# Patient Record
Sex: FEMALE | Race: WHITE | NOT HISPANIC OR LATINO
[De-identification: names, ages, dates, MRNs, and addresses within clinical notes are randomized per-mention and may not be internally consistent; named-entity substitution may affect disease eponyms.]

---

## 2017-03-17 ENCOUNTER — APPOINTMENT (OUTPATIENT)
Dept: OBGYN | Facility: CLINIC | Age: 62
End: 2017-03-17

## 2017-03-17 VITALS — WEIGHT: 165 LBS | HEIGHT: 61 IN | BODY MASS INDEX: 31.15 KG/M2

## 2017-03-17 VITALS — SYSTOLIC BLOOD PRESSURE: 135 MMHG | DIASTOLIC BLOOD PRESSURE: 83 MMHG

## 2017-03-19 ENCOUNTER — MESSAGE (OUTPATIENT)
Age: 62
End: 2017-03-19

## 2017-03-19 LAB — HPV HIGH+LOW RISK DNA PNL CVX: NEGATIVE

## 2017-03-21 ENCOUNTER — MESSAGE (OUTPATIENT)
Age: 62
End: 2017-03-21

## 2017-03-21 LAB — CYTOLOGY CVX/VAG DOC THIN PREP: NORMAL

## 2019-10-02 ENCOUNTER — APPOINTMENT (OUTPATIENT)
Dept: OBGYN | Facility: CLINIC | Age: 64
End: 2019-10-02
Payer: COMMERCIAL

## 2019-10-02 ENCOUNTER — TRANSCRIPTION ENCOUNTER (OUTPATIENT)
Age: 64
End: 2019-10-02

## 2019-10-02 VITALS
WEIGHT: 170 LBS | SYSTOLIC BLOOD PRESSURE: 139 MMHG | DIASTOLIC BLOOD PRESSURE: 82 MMHG | HEIGHT: 61 IN | BODY MASS INDEX: 32.1 KG/M2

## 2019-10-02 PROCEDURE — 99396 PREV VISIT EST AGE 40-64: CPT

## 2019-10-02 PROCEDURE — 82270 OCCULT BLOOD FECES: CPT

## 2019-10-02 NOTE — PHYSICAL EXAM
[Awake] : awake [Alert] : alert [Acute Distress] : no acute distress [Mass] : no breast mass [Nipple Discharge] : no nipple discharge [Axillary LAD] : no axillary lymphadenopathy [Soft] : soft [Tender] : non tender [Oriented x3] : oriented to person, place, and time [Normal] : uterus [No Bleeding] : there was no active vaginal bleeding [Uterine Adnexae] : were not tender and not enlarged [No Tenderness] : no rectal tenderness [Occult Blood] : occult blood test from digital rectal exam was negative [Nl Sphincter Tone] : normal sphincter tone [External Hemorrhoid] : an external hemorrhoid

## 2019-10-04 ENCOUNTER — MESSAGE (OUTPATIENT)
Age: 64
End: 2019-10-04

## 2019-10-04 LAB — HPV HIGH+LOW RISK DNA PNL CVX: NOT DETECTED

## 2019-10-06 ENCOUNTER — MESSAGE (OUTPATIENT)
Age: 64
End: 2019-10-06

## 2019-10-06 LAB — CYTOLOGY CVX/VAG DOC THIN PREP: NORMAL

## 2019-11-05 ENCOUNTER — CLINICAL ADVICE (OUTPATIENT)
Age: 64
End: 2019-11-05

## 2020-11-25 ENCOUNTER — APPOINTMENT (OUTPATIENT)
Dept: OBGYN | Facility: CLINIC | Age: 65
End: 2020-11-25
Payer: COMMERCIAL

## 2020-11-25 VITALS
WEIGHT: 170 LBS | HEIGHT: 61 IN | DIASTOLIC BLOOD PRESSURE: 89 MMHG | SYSTOLIC BLOOD PRESSURE: 152 MMHG | BODY MASS INDEX: 32.1 KG/M2

## 2020-11-25 DIAGNOSIS — Z12.11 ENCOUNTER FOR SCREENING FOR MALIGNANT NEOPLASM OF COLON: ICD-10-CM

## 2020-11-25 DIAGNOSIS — Z12.12 ENCOUNTER FOR SCREENING FOR MALIGNANT NEOPLASM OF COLON: ICD-10-CM

## 2020-11-25 PROCEDURE — 99396 PREV VISIT EST AGE 40-64: CPT

## 2020-11-25 PROCEDURE — 82270 OCCULT BLOOD FECES: CPT

## 2020-11-25 NOTE — DISCUSSION/SUMMARY
[FreeTextEntry1] : 64yo without gyn complaint.\par Repeat /90, which she will follow up with IM\par Plan:\par 1.Mammo\par 2.PAP\par 3.Colonosocpy\par 4.BMD 2019

## 2020-11-25 NOTE — PHYSICAL EXAM
[Examination Of The Breasts] : a normal appearance [No Masses] : no breast masses were palpable [Labia Majora] : normal [Labia Minora] : normal [Normal] : normal [Uterine Adnexae] : normal [No Tenderness] : no tenderness [Nl Sphincter Tone] : normal sphincter tone [External Hemorrhoid] : external hemorrhoid

## 2020-11-25 NOTE — HISTORY OF PRESENT ILLNESS
[FreeTextEntry1] : 64yo,  with gyn history significant for:\par 1.Endometrial polyps 2015\par LMP 2014

## 2020-11-28 ENCOUNTER — MESSAGE (OUTPATIENT)
Age: 65
End: 2020-11-28

## 2020-11-28 LAB — HPV HIGH+LOW RISK DNA PNL CVX: NOT DETECTED

## 2020-11-30 ENCOUNTER — MESSAGE (OUTPATIENT)
Age: 65
End: 2020-11-30

## 2020-11-30 LAB — CYTOLOGY CVX/VAG DOC THIN PREP: NORMAL

## 2022-01-23 ENCOUNTER — NON-APPOINTMENT (OUTPATIENT)
Age: 67
End: 2022-01-23

## 2022-01-24 ENCOUNTER — APPOINTMENT (OUTPATIENT)
Dept: ORTHOPEDIC SURGERY | Facility: CLINIC | Age: 67
End: 2022-01-24
Payer: MEDICARE

## 2022-01-24 DIAGNOSIS — M47.26 OTHER SPONDYLOSIS WITH RADICULOPATHY, LUMBAR REGION: ICD-10-CM

## 2022-01-24 PROCEDURE — 99204 OFFICE O/P NEW MOD 45 MIN: CPT

## 2022-01-24 PROCEDURE — 72100 X-RAY EXAM L-S SPINE 2/3 VWS: CPT

## 2022-01-24 RX ORDER — FAMOTIDINE 40 MG/1
40 TABLET, FILM COATED ORAL
Qty: 90 | Refills: 0 | Status: ACTIVE | COMMUNITY
Start: 2021-05-11

## 2022-01-24 RX ORDER — CHLORHEXIDINE GLUCONATE, 0.12% ORAL RINSE 1.2 MG/ML
0.12 SOLUTION DENTAL
Qty: 473 | Refills: 0 | Status: ACTIVE | COMMUNITY
Start: 2021-11-04

## 2022-01-24 RX ORDER — LISINOPRIL AND HYDROCHLOROTHIAZIDE TABLETS 20; 12.5 MG/1; MG/1
20-12.5 TABLET ORAL
Qty: 90 | Refills: 0 | Status: ACTIVE | COMMUNITY
Start: 2021-12-08

## 2022-01-24 RX ORDER — FAMOTIDINE 20 MG/1
20 TABLET, FILM COATED ORAL
Qty: 180 | Refills: 0 | Status: ACTIVE | COMMUNITY
Start: 2021-09-08

## 2022-01-24 NOTE — CONSULT LETTER
[Dear  ___] : Dear  [unfilled], [Consult Letter:] : I had the pleasure of evaluating your patient, [unfilled]. [Please see my note below.] : Please see my note below. [Consult Closing:] : Thank you very much for allowing me to participate in the care of this patient.  If you have any questions, please do not hesitate to contact me. [Sincerely,] : Sincerely, [FreeTextEntry2] : [address], [place] [address] [FreeTextEntry3] : Aston Del Angel DO, ATC\par Primary Care Sports Medicine\par [facility]

## 2022-01-24 NOTE — PHYSICAL EXAM
[de-identified] : Constitutional: Well-nourished, well-developed, No acute distress, obese\par Respiratory:  Good respiratory effort, no SOB\par Lymphatic: No regional lymphadenopathy, no lymphedema\par Psychiatric: Pleasant and normal affect, alert and oriented x3\par Musculoskeletal: normal except where as noted in regional exam\par \par Lumbar Spine Exam\par \par APPEARANCE: no marked deformities or malalignment, normal curvature of the lumbosacral spine. good posture\par POSITIVE TENDERNESS: + Left SI joint, left lumbar tenderness and spasm noted in erector spinae and quadratus lumborum\par NONTENDER: no bony midline tenderness, no marked tenderness in right lumbar musculature.\par ROM: full, although painful at end range of flexion and extension\par RESISTIVE TESTING: painless 5/5 resisted flex/ext, sidebending b/l, and rotation\par SPECIAL TESTS: neg SLR b/l, neg NEFTALY b/l, neg Trendelenburg b/l \par PULSES: 2+ DP/PT pulses\par NEURO:  L1 - S2 intact to sensation and motor, DTRs 2+/4 patella and achilles\par  [de-identified] : The following radiographs were ordered and read by me during this patient's visit. I reviewed each radiograph in detail with the patient and discussed the findings as highlighted below. \par \par 2 views of the lumbar spine were obtained today that show degenerative changes and evidence of severe osteoarthritis in the L4-L5 level, otherwise only mild arthritic changes at the other levels.  No fracture, or dislocation seen at this time. There is grade 1/grade 2 anterolisthesis of L4 on L5 malalignment. No other obvious osseous abnormality. Otherwise unremarkable.\par \par _______________\par I reviewed, interpreted and clinically correlated the following outside imaging studies,\par PATIENT NAME: Kimberly Ugalde\par PATIENT ID: [id number]\par : 1955\par DATE OF EXAM: 12/10/2019\par CT-LUMBAR SPINE NON CONTRAST\par HISTORY: M54.42 Lower back pain with left sciatica\par Technique: Helical CT scanning of the lumbar spine was performed without administration of\par intravenous contrast. Coronal and sagittal reformatted images were done. This study was\par performed using automatic exposure control and an iterative reconstruction technique\par (radiation dose reduction software) to obtain a diagnostic image quality scan with patient\par dose as low as reasonably achievable. The mA and kV were adjusted according to patient\par size. The administered radiation dose was 12.54 mSv.\par Findings:\par Comparison: None\par Alignment: There is grade 1 anterolisthesis of L4-L5 measuring 6 mm.\par Fractures: No acute fractures are identified. Vertebral body height is preserved.\par Paraspinal soft tissues: The patient is status post cholecystectomy.\par Discs: There is disc desiccation, severe loss of disc space height and vacuum disc\par phenomenon at L4-L5.\par At L1-2: No significant spinal canal or neural foraminal stenosis.\par At L2-3: There is mild disc bulge without significant canal stenosis or neural foraminal\par narrowing.\par At L3-4: There is disc bulge and right facet hypertrophy resulting in mild to moderate\par right and mild left neural foraminal narrowing with contact of the exiting right L3 nerve\par root.\par At L4-5: There is grade 1 anterolisthesis with uncovering of the disc and severe bilateral\par facet hypertrophy resulting in moderate to severe canal stenosis and severe bilateral\par neural foraminal narrowing with compression of the exiting L4 nerve roots.\par At L5-S1: No significant spinal canal or neural foraminal stenosis.\par IMPRESSION:\par Degenerative changes of the lumbar spine as described above most severe at L4-L5 where \par there is grade 1 anterolisthesis with uncovering of the disc and severe bilateral facet\par hypertrophy resulting in moderate to severe canal stenosis and severe bilateral neural\par foraminal narrowing with compression of the exiting L4 nerve roots.

## 2022-01-24 NOTE — HISTORY OF PRESENT ILLNESS
[de-identified] : Patient is here for LBP that began on 12/24/21 when she fell while in the [employer] on a broken tile. She developed LBP. She has a history of spinal stenosis which was diagnosed in 2019. She states that it was treated with PT and she has not had an issue since. She takes OTC pain medication. She has pain that radiates down her left leg with numbness and tingling. She states that she also has pain on her right shoulder and right lateral hip from the fall.  She saw her PCP for this issue who recommended PT. She has not started yet. She does not work but does watch her grandchildren. \par \par The patient's past medical history, past surgical history, medications and allergies were reviewed by me today and documented accordingly. In addition, the patient's family and social history, which were noncontributory to this visit, were reviewed also. Intake form was reviewed. The patient has no family history of arthritis.

## 2022-01-24 NOTE — DISCUSSION/SUMMARY
[de-identified] : Discussed findings of today's exam and possible causes of patient's pain.  Educated patient on their most probable diagnosis of low back pain with intermittent left lower extremity radiculopathy due to exacerbation of underlying severe osteoarthritis at the L4-L5 level status post fall 1 month ago.  Reviewed possible courses of treatment, and we collaboratively decided best course of treatment at this time will include conservative management.  Patient has prior CT scan of her lumbar spine performed in 2019, I compare that CT scan to today's x-ray which shows that patient has the same level of severe osteoarthritis of the L4-L5 level with grade 1–grade 2 anterolisthesis.  Patient states she was essentially pain-free until her recent fall 1 month ago.  Patient is educated that she has no apparent new or acute injury, she has exacerbation of this prior existing but asymptomatic severe osteoarthritis.  Patient is advised she has no surgical indications based on history and clinical exam as she has no sensory loss deficits, or motor weakness, as well as no red flag or warning signs/symptoms.  Patient is advised that exacerbations of underlying osteoarthritis can be painful, but with appropriate conservative measures we should be able to return her to her baseline level of function which was pain-free ADLs and other activities.  Patient is advised the goal of treatment is not to undo the osteoarthritis as only surgery can do that, but appropriate conservative treatment can get her back to her baseline level of function prior to her recent fall.  Patient has history of gastritis when taking oral naproxen for several months consecutively.  She is advised that this is a known side effect of oral NSAIDs, but short-term use of oral NSAIDs can often help alleviate pain without developing unwanted side effects.  Patient is started on a prescription of oral NSAIDs, recommend use of naproxen, she is advised to take it twice a day with food consistently for 1 week, then as needed thereafter (We discussed all possible side effects of this medication).  Patient will be started on a course of physical therapy to restore normal range of motion and strength as tolerated.  Patient is advised I would recommend follow-up in 2-3 weeks for reassessment.  If patient is not making interval improvement may consider MRI of the lumbar spine at that time to quantify the extent of her degenerative disc disease, and if patient continues to have radicular pain she would benefit from physiatry consultation at that time to discuss risk/benefits of epidural steroid injection.\par Patient states she does have a  involved as she is taking legal action against [employer] which was the site of her injury.\par Patient appreciates and agrees with current plan.\par \par I work as part of an academic orthopedic group and routinely have a physician in training (resident / fellow) working with me.  Any part of the history and physical exam performed by the physician in training was either directly reviewed and/or replicated by myself.  Any procedure performed by the physician in training was performed under my direct supervision and with the consent of the patient.\par \par This note was generated using dragon medical dictation software.  A reasonable effort has been made for proofreading its contents, but typos may still remain.  If there are any questions or points of clarification needed please notify my office.

## 2022-01-27 RX ORDER — NAPROXEN 500 MG/1
500 TABLET ORAL
Qty: 60 | Refills: 0 | Status: ACTIVE | COMMUNITY
Start: 2022-01-24 | End: 1900-01-01

## 2022-01-31 ENCOUNTER — APPOINTMENT (OUTPATIENT)
Dept: ORTHOPEDIC SURGERY | Facility: CLINIC | Age: 67
End: 2022-01-31
Payer: MEDICARE

## 2022-01-31 VITALS — HEIGHT: 61 IN | WEIGHT: 165 LBS | BODY MASS INDEX: 31.15 KG/M2

## 2022-01-31 DIAGNOSIS — M75.41 IMPINGEMENT SYNDROME OF RIGHT SHOULDER: ICD-10-CM

## 2022-01-31 DIAGNOSIS — M70.61 TROCHANTERIC BURSITIS, RIGHT HIP: ICD-10-CM

## 2022-01-31 DIAGNOSIS — M25.551 PAIN IN RIGHT HIP: ICD-10-CM

## 2022-01-31 DIAGNOSIS — M25.511 PAIN IN RIGHT SHOULDER: ICD-10-CM

## 2022-01-31 PROCEDURE — 73502 X-RAY EXAM HIP UNI 2-3 VIEWS: CPT | Mod: RT

## 2022-01-31 PROCEDURE — 99214 OFFICE O/P EST MOD 30 MIN: CPT

## 2022-01-31 PROCEDURE — 73030 X-RAY EXAM OF SHOULDER: CPT | Mod: RT

## 2022-01-31 NOTE — PHYSICAL EXAM
[de-identified] : Constitutional: Well-nourished, well-developed, No acute distress\par Respiratory:  Good respiratory effort, no SOB\par Psychiatric: Pleasant and normal affect, alert and oriented x3\par Skin: Clean dry and intact B/L UE\par Musculoskeletal: normal except where as noted in regional exam\par \par Right Shoulder:\par APPEARANCE: no marked deformities, no swelling or malalignment\par POSITIVE TENDERNESS: supraspinatus, long head biceps tendon\par NONTENDER:  infraspinatus, teres minor. biceps. anterior and posterior capsule. AC joint. \par ROM: full with mild painful arc past 60 degrees, no scapular winging or dyskinesia present\par RESISTIVE TESTING: MMT 4+/5 ER, Flexion and Empty can, 5/5 IR. painless 5/5 resisted ext, horizontal abd/add \par SPECIAL TESTS: + Bullard and Neers, mildly + cross arm adduction, + Speeds, neg Quintanilla's, neg Drop Arm, neg Apprehension. neg apley's scratch test\par \par Right hip:\par APPEARANCE: no marked deformities, no swelling or malalignment\par POSITIVE TENDERNESS: Greater trochanter, and mild distally along ITB\par NONTENDER: TFL, gluteal region, ischium/proximal hamstring region, hip flexor region, sartorius and pubic symphysis. \par ROM: full & painless. \par RESISTIVE TESTING: Mild pain in lateral hip with resisted abduction, painless resisted ER/IR/SLR/adduction. \par SPECIAL TESTS: neg NEFTALY/FADIR, painless loaded flexion & scouring\par  [de-identified] : The following radiographs were ordered and read by me during this patient's visit. I reviewed each radiograph in detail with the patient and discussed the findings as highlighted below. \par \par 3 views of the right shoulder were obtained today that show degenerative changes and evidence of moderate osteoarthritis in the glenohumeral joint.  No fracture, or dislocation seen at this time. There is no malalignment. No other obvious osseous abnormality. Otherwise unremarkable.\par \par 2 views of the right hip were obtained today that show no fracture, or dislocation. There are no degenerative changes seen. There is no malalignment. No obvious osseous abnormality. Otherwise unremarkable.

## 2022-01-31 NOTE — HISTORY OF PRESENT ILLNESS
[de-identified] : Patient is here for right shoulder/hip pain that began after her fall at the [employer]. She has taken OTC pain medication. Denies prior injuries.

## 2022-01-31 NOTE — DISCUSSION/SUMMARY
[de-identified] : Discussed findings of today's exam and possible causes of patient's pain.  Educated patient on their most probable diagnosis of right shoulder pain status post fall due to subacromial impingement and likely exacerbation of underlying moderate glenohumeral osteoarthritis, as well as right lateral hip pain status post fall due to trochanteric bursitis and peritrochanteric tendinitis.  Reviewed possible courses of treatment, and we collaboratively decided best course of treatment at this time will include conservative management.  Patient was already prescribed oral NSAIDs with will help address pain in this area, she just picked up the prescription this past Friday.  Patient will be started on a course of physical therapy to restore normal range of motion and strength as tolerated.  Patient is advised she has no evidence of any significant soft tissue pathology, no evidence of significant or large grade rotator cuff tear, nor any evidence of significant myofascial tear in the right hip area.  She has no surgical indications based on history and clinical exam, no indication for MRI of the right shoulder or the right hip at this time.  Recommend a course of conservative management prior to considering cortisone injections as of yet.  Recommend follow-up in 1 month for reassessment.  Patient appreciates and agrees with current plan.\par \par \par This note was generated using dragon medical dictation software.  A reasonable effort has been made for proofreading its contents, but typos may still remain.  If there are any questions or points of clarification needed please notify my office.

## 2022-02-07 ENCOUNTER — APPOINTMENT (OUTPATIENT)
Dept: OBGYN | Facility: CLINIC | Age: 67
End: 2022-02-07

## 2022-04-11 ENCOUNTER — APPOINTMENT (OUTPATIENT)
Dept: OBGYN | Facility: CLINIC | Age: 67
End: 2022-04-11
Payer: MEDICARE

## 2022-04-11 VITALS
SYSTOLIC BLOOD PRESSURE: 124 MMHG | BODY MASS INDEX: 31.15 KG/M2 | HEIGHT: 61 IN | WEIGHT: 165 LBS | DIASTOLIC BLOOD PRESSURE: 82 MMHG

## 2022-04-11 DIAGNOSIS — Z01.419 ENCOUNTER FOR GYNECOLOGICAL EXAMINATION (GENERAL) (ROUTINE) W/OUT ABNORMAL FINDINGS: ICD-10-CM

## 2022-04-11 PROCEDURE — 99397 PER PM REEVAL EST PAT 65+ YR: CPT

## 2022-04-11 NOTE — HISTORY OF PRESENT ILLNESS
[FreeTextEntry1] : 66yo,  with gyn history significant for:\par 1.Endometrial polyp 2015\par LMP 2014

## 2022-04-11 NOTE — DISCUSSION/SUMMARY
[FreeTextEntry1] : 66yo without gyn complaint\par Colonoscpy within this year, wnl\par Plan:\par 1.PAP\par 2.BMD\par 3.Mammo 1/22

## 2022-04-18 ENCOUNTER — MESSAGE (OUTPATIENT)
Age: 67
End: 2022-04-18

## 2022-04-18 LAB — CYTOLOGY CVX/VAG DOC THIN PREP: NORMAL

## 2022-04-25 ENCOUNTER — NON-APPOINTMENT (OUTPATIENT)
Age: 67
End: 2022-04-25

## 2022-05-04 ENCOUNTER — NON-APPOINTMENT (OUTPATIENT)
Age: 67
End: 2022-05-04

## 2022-05-10 ENCOUNTER — APPOINTMENT (OUTPATIENT)
Dept: OBGYN | Facility: CLINIC | Age: 67
End: 2022-05-10

## 2022-06-14 ENCOUNTER — APPOINTMENT (OUTPATIENT)
Dept: OTOLARYNGOLOGY | Facility: CLINIC | Age: 67
End: 2022-06-14
Payer: MEDICARE

## 2022-06-14 VITALS
HEART RATE: 62 BPM | WEIGHT: 165 LBS | SYSTOLIC BLOOD PRESSURE: 127 MMHG | HEIGHT: 61 IN | BODY MASS INDEX: 31.15 KG/M2 | DIASTOLIC BLOOD PRESSURE: 81 MMHG

## 2022-06-14 DIAGNOSIS — Z87.898 PERSONAL HISTORY OF OTHER SPECIFIED CONDITIONS: ICD-10-CM

## 2022-06-14 DIAGNOSIS — Z86.79 PERSONAL HISTORY OF OTHER DISEASES OF THE CIRCULATORY SYSTEM: ICD-10-CM

## 2022-06-14 PROCEDURE — 99212 OFFICE O/P EST SF 10 MIN: CPT

## 2022-06-14 RX ORDER — METOPROLOL SUCCINATE 50 MG/1
50 TABLET, EXTENDED RELEASE ORAL
Refills: 0 | Status: ACTIVE | COMMUNITY

## 2022-06-14 NOTE — PHYSICAL EXAM
[de-identified] : Bilateral cerumen impaction, unable to be removed. [de-identified] : Unable to visualize tympanic membranes [Midline] : trachea located in midline position [Normal] : no rashes

## 2022-06-14 NOTE — ASSESSMENT
[FreeTextEntry1] : Kimberly Ugalde presents for evaluation of bilateral aural fullness. She has significant bilateral cerumen impaction. This was attempted to be removed, but she has significant medially impacted solid cerumen. Will start debrox drops and attempt again next week.\par \par - Debrox drops daily.\par - Follow up in 1 week.

## 2022-06-14 NOTE — HISTORY OF PRESENT ILLNESS
[de-identified] : Kimberly Ugalde is a 66 yo female who presents for evaluation of bilateral aural fullness. She notes that she has been told she has cerumen impaction. Last week, she noted swollen lymph nodes in the right neck that were painful but these have resolved. She now notes that she has right otalgia. She denies otorrhea, tinnitus, vertigo. She notes some diminished hearing bilaterally. She denies fevers, chills, or recurrent ear infections. She denies facial weakness or facial numbness. She notes that she uses Q-tips.

## 2022-06-21 ENCOUNTER — APPOINTMENT (OUTPATIENT)
Dept: OTOLARYNGOLOGY | Facility: CLINIC | Age: 67
End: 2022-06-21
Payer: MEDICARE

## 2022-06-21 VITALS
HEART RATE: 56 BPM | DIASTOLIC BLOOD PRESSURE: 76 MMHG | SYSTOLIC BLOOD PRESSURE: 116 MMHG | HEIGHT: 61 IN | WEIGHT: 165 LBS | BODY MASS INDEX: 31.15 KG/M2

## 2022-06-21 DIAGNOSIS — H93.8X3 OTHER SPECIFIED DISORDERS OF EAR, BILATERAL: ICD-10-CM

## 2022-06-21 PROCEDURE — G0268 REMOVAL OF IMPACTED WAX MD: CPT

## 2022-06-21 PROCEDURE — 99213 OFFICE O/P EST LOW 20 MIN: CPT | Mod: 25

## 2022-06-21 RX ORDER — CIPROFLOXACIN AND DEXAMETHASONE 3; 1 MG/ML; MG/ML
0.3-0.1 SUSPENSION/ DROPS AURICULAR (OTIC) TWICE DAILY
Qty: 1 | Refills: 1 | Status: ACTIVE | COMMUNITY
Start: 2022-06-21 | End: 1900-01-01

## 2022-06-21 RX ORDER — NEOMYCIN SULFATE, POLYMYXIN B SULFATE, HYDROCORTISONE 3.5; 10000; 1 MG/ML; [USP'U]/ML; MG/ML
1 SOLUTION/ DROPS AURICULAR (OTIC) TWICE DAILY
Qty: 1 | Refills: 1 | Status: ACTIVE | COMMUNITY
Start: 2022-06-21 | End: 1900-01-01

## 2022-06-21 NOTE — ASSESSMENT
[FreeTextEntry1] : Kimberly Ugalde presents for follow-up of bilateral aural fullness in the setting of cerumen impaction. She applied bilateral debrox drops. Bilateral cerumen was removed today. She has evidence of bilateral otitis externa with narrow external auditory canals. Will start ciprodex drops as below.\par \par - Start ciprodex - 5 drops twice daily to both ears for 7 days.\par - Dry ear precautions.\par - Follow up in 2 weeks.

## 2022-06-21 NOTE — PHYSICAL EXAM
[de-identified] : Bilateral cerumen impaction. Once removed, narrow EACs noted with inflammation. [Midline] : trachea located in midline position [Normal] : no rashes

## 2022-06-21 NOTE — HISTORY OF PRESENT ILLNESS
[de-identified] : Kimberly Ugalde is a 66 yo female who presents for evaluation of bilateral aural fullness. She notes that she has been told she has cerumen impaction. Last week, she noted swollen lymph nodes in the right neck that were painful but these have resolved. She now notes that she has right otalgia. She denies otorrhea, tinnitus, vertigo. She notes some diminished hearing bilaterally. She denies fevers, chills, or recurrent ear infections. She denies facial weakness or facial numbness. She notes that she uses Q-tips. [FreeTextEntry1] : 6/21/22 - Ms. Ugalde kartik for follow-up. She applied debrox drops for her cerumen impaction. She notes bilateral aural fulness and subjectively diminished hearing. She denies otalgia, otorrhea, tinnitus, vertigo. She denies fevers or chills.

## 2022-07-06 ENCOUNTER — APPOINTMENT (OUTPATIENT)
Dept: OTOLARYNGOLOGY | Facility: CLINIC | Age: 67
End: 2022-07-06

## 2022-07-06 VITALS
HEART RATE: 56 BPM | SYSTOLIC BLOOD PRESSURE: 118 MMHG | WEIGHT: 185 LBS | DIASTOLIC BLOOD PRESSURE: 75 MMHG | BODY MASS INDEX: 34.93 KG/M2 | HEIGHT: 61 IN

## 2022-07-06 DIAGNOSIS — H60.503 UNSPECIFIED ACUTE NONINFECTIVE OTITIS EXTERNA, BILATERAL: ICD-10-CM

## 2022-07-06 PROCEDURE — 99212 OFFICE O/P EST SF 10 MIN: CPT

## 2022-07-06 RX ORDER — ERGOCALCIFEROL 1.25 MG/1
1.25 MG CAPSULE, LIQUID FILLED ORAL
Qty: 12 | Refills: 0 | Status: ACTIVE | COMMUNITY
Start: 2022-02-09

## 2022-07-06 RX ORDER — METOPROLOL SUCCINATE 25 MG/1
25 TABLET, EXTENDED RELEASE ORAL
Qty: 90 | Refills: 0 | Status: ACTIVE | COMMUNITY
Start: 2022-02-07

## 2022-07-06 RX ORDER — CLINDAMYCIN HYDROCHLORIDE 150 MG/1
150 CAPSULE ORAL
Qty: 21 | Refills: 0 | Status: ACTIVE | COMMUNITY
Start: 2022-04-05

## 2022-07-06 NOTE — ASSESSMENT
[FreeTextEntry1] : Kimberly Ugalde presents for follow-up of bilateral otitis externa now s/p treatment with ciprodex drops. Infection has resolved.\par \par - Follow up as needed.

## 2022-07-06 NOTE — PHYSICAL EXAM
[de-identified] : Resolved inflammation of bilateral EACs. [Midline] : trachea located in midline position [Normal] : no rashes

## 2022-07-06 NOTE — HISTORY OF PRESENT ILLNESS
[de-identified] : Kimberly Ugalde is a 68 yo female who presents for evaluation of bilateral aural fullness. She notes that she has been told she has cerumen impaction. Last week, she noted swollen lymph nodes in the right neck that were painful but these have resolved. She now notes that she has right otalgia. She denies otorrhea, tinnitus, vertigo. She notes some diminished hearing bilaterally. She denies fevers, chills, or recurrent ear infections. She denies facial weakness or facial numbness. She notes that she uses Q-tips. [FreeTextEntry1] : 6/21/22 - Ms. Ugalde presnets for follow-up. She applied debrox drops for her cerumen impaction. She notes bilateral aural fulness and subjectively diminished hearing. She denies otalgia, otorrhea, tinnitus, vertigo. She denies fevers or chills.\par \par 7/6/22 - Ms. Ugalde presents for follow-up. She completed ciprodex drops for otitis externa. She denies otalgia, otorrhea, tinnitus, vertigo, hearing change, facial weakness, facial numbness. She denies fevers or chills.

## 2024-01-02 ENCOUNTER — NON-APPOINTMENT (OUTPATIENT)
Age: 69
End: 2024-01-02

## 2024-01-08 ENCOUNTER — APPOINTMENT (OUTPATIENT)
Dept: OTOLARYNGOLOGY | Facility: CLINIC | Age: 69
End: 2024-01-08
Payer: MEDICARE

## 2024-01-08 VITALS — SYSTOLIC BLOOD PRESSURE: 122 MMHG | HEART RATE: 62 BPM | DIASTOLIC BLOOD PRESSURE: 78 MMHG

## 2024-01-08 VITALS — HEIGHT: 61 IN | BODY MASS INDEX: 31.15 KG/M2 | WEIGHT: 165 LBS

## 2024-01-08 DIAGNOSIS — H93.293 OTHER ABNORMAL AUDITORY PERCEPTIONS, BILATERAL: ICD-10-CM

## 2024-01-08 DIAGNOSIS — H61.23 IMPACTED CERUMEN, BILATERAL: ICD-10-CM

## 2024-01-08 PROCEDURE — 99213 OFFICE O/P EST LOW 20 MIN: CPT | Mod: 25

## 2024-01-08 PROCEDURE — 69210 REMOVE IMPACTED EAR WAX UNI: CPT

## 2024-01-08 RX ORDER — ROSUVASTATIN CALCIUM 5 MG/1
TABLET, FILM COATED ORAL
Refills: 0 | Status: ACTIVE | COMMUNITY

## 2024-01-08 NOTE — PHYSICAL EXAM
[Normal] : mucosa is normal [Midline] : trachea located in midline position [de-identified] : CI AU

## 2024-01-08 NOTE — HISTORY OF PRESENT ILLNESS
[de-identified] : 68 yr old female c/o clogged ears using Debrox for a few days  -otalgia, tinnitus, dizzy +occ otitis -noise exp, head trauma +FH mother presby

## 2025-06-24 ENCOUNTER — NON-APPOINTMENT (OUTPATIENT)
Age: 70
End: 2025-06-24

## 2025-06-26 ENCOUNTER — APPOINTMENT (OUTPATIENT)
Dept: OTOLARYNGOLOGY | Facility: CLINIC | Age: 70
End: 2025-06-26
Payer: MEDICARE

## 2025-06-26 VITALS
HEART RATE: 64 BPM | DIASTOLIC BLOOD PRESSURE: 80 MMHG | BODY MASS INDEX: 29.27 KG/M2 | SYSTOLIC BLOOD PRESSURE: 126 MMHG | HEIGHT: 61 IN | WEIGHT: 155 LBS

## 2025-06-26 PROCEDURE — 69210 REMOVE IMPACTED EAR WAX UNI: CPT

## 2025-06-26 PROCEDURE — 99213 OFFICE O/P EST LOW 20 MIN: CPT | Mod: 25
